# Patient Record
Sex: FEMALE | Race: OTHER | NOT HISPANIC OR LATINO | Employment: STUDENT | RURAL
[De-identification: names, ages, dates, MRNs, and addresses within clinical notes are randomized per-mention and may not be internally consistent; named-entity substitution may affect disease eponyms.]

---
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
## 2023-01-05 DIAGNOSIS — L70.0 ACNE VULGARIS: ICD-10-CM

## 2023-01-05 RX ORDER — TRETINOIN 0.5 MG/G
CREAM TOPICAL
Qty: 45 G | Refills: 0 | Status: SHIPPED | OUTPATIENT
Start: 2023-01-05 | End: 2023-01-25 | Stop reason: SDUPTHER

## 2023-01-25 DIAGNOSIS — L70.0 ACNE VULGARIS: ICD-10-CM

## 2023-01-25 RX ORDER — TRETINOIN 0.5 MG/G
CREAM TOPICAL
Qty: 45 G | Refills: 11 | Status: SHIPPED | OUTPATIENT
Start: 2023-01-25 | End: 2023-01-25 | Stop reason: SDUPTHER

## 2023-01-25 RX ORDER — TRETINOIN 0.5 MG/G
CREAM TOPICAL
Qty: 45 G | Refills: 11 | Status: SHIPPED | OUTPATIENT
Start: 2023-01-25

## 2023-06-28 ENCOUNTER — OFFICE VISIT (OUTPATIENT)
Dept: DERMATOLOGY | Facility: CLINIC | Age: 18
End: 2023-06-28
Payer: COMMERCIAL

## 2023-06-28 VITALS — HEIGHT: 63 IN | WEIGHT: 123.44 LBS | BODY MASS INDEX: 21.87 KG/M2

## 2023-06-28 DIAGNOSIS — L70.0 ACNE VULGARIS: Primary | ICD-10-CM

## 2023-06-28 DIAGNOSIS — L70.0 ACNE VULGARIS: ICD-10-CM

## 2023-06-28 PROCEDURE — 3008F PR BODY MASS INDEX (BMI) DOCUMENTED: ICD-10-PCS | Mod: ,,, | Performed by: DERMATOLOGY

## 2023-06-28 PROCEDURE — 1160F RVW MEDS BY RX/DR IN RCRD: CPT | Mod: ,,, | Performed by: DERMATOLOGY

## 2023-06-28 PROCEDURE — 3008F BODY MASS INDEX DOCD: CPT | Mod: ,,, | Performed by: DERMATOLOGY

## 2023-06-28 PROCEDURE — 99214 PR OFFICE/OUTPT VISIT, EST, LEVL IV, 30-39 MIN: ICD-10-PCS | Mod: ,,, | Performed by: DERMATOLOGY

## 2023-06-28 PROCEDURE — 1159F MED LIST DOCD IN RCRD: CPT | Mod: ,,, | Performed by: DERMATOLOGY

## 2023-06-28 PROCEDURE — 1159F PR MEDICATION LIST DOCUMENTED IN MEDICAL RECORD: ICD-10-PCS | Mod: ,,, | Performed by: DERMATOLOGY

## 2023-06-28 PROCEDURE — 99214 OFFICE O/P EST MOD 30 MIN: CPT | Mod: ,,, | Performed by: DERMATOLOGY

## 2023-06-28 PROCEDURE — 1160F PR REVIEW ALL MEDS BY PRESCRIBER/CLIN PHARMACIST DOCUMENTED: ICD-10-PCS | Mod: ,,, | Performed by: DERMATOLOGY

## 2023-06-28 RX ORDER — NORGESTIMATE AND ETHINYL ESTRADIOL 7DAYSX3 LO
KIT ORAL
Qty: 84 TABLET | Refills: 5 | Status: SHIPPED | OUTPATIENT
Start: 2023-06-28

## 2023-06-28 RX ORDER — DOXYCYCLINE 100 MG/1
100 CAPSULE ORAL EVERY 12 HOURS
Qty: 60 CAPSULE | Refills: 0 | Status: SHIPPED | OUTPATIENT
Start: 2023-06-28 | End: 2023-07-28

## 2023-06-28 NOTE — Clinical Note
Will you please refer Gemini to [facility] dermatology?  She will need an appointment around the end of August for isotretinoin follow up.  Here is there website:  [email or web address]  Will you also remind Gemini to get her labwork if she doesn't go today.

## 2023-06-28 NOTE — PROGRESS NOTES
[facility]   Za Short MD    Patient Name: Gemini Ulloa  Patient YOB: 2005  Date of Service: 6/28/23    CC: Acne    HPI: Gemini Ulloa is a 17 y.o. female who is following up for acne vulgaris currently on tretinoin.  Previous treatments include a topical retinoid, a topical antibiotic and systemic antibiotics and a previous course of isotretinoin.  Overall, her acne is still flaring despite treatment.  She has been compliant with her treatment plan.  Patient is not currently pregnant, breast feeding, or trying to become pregnant.    A Focused review of systems was negative.    Past Medical History:   Diagnosis Date    Acne     HSV (herpes simplex virus) infection      Past Surgical History:   Procedure Laterality Date    EYE SURGERY  06/2021     Review of patient's allergies indicates:  No Known Allergies    Current Outpatient Medications:     doxycycline (MONODOX) 100 MG capsule, Take 1 capsule (100 mg total) by mouth every 12 (twelve) hours., Disp: 60 capsule, Rfl: 0    HYDROcodone-acetaminophen (NORCO) 7.5-325 mg per tablet, Take 1 tablet by mouth 4 (four) times daily as needed., Disp: , Rfl:     norgestimate-ethinyl estradioL (ORTHO TRI-CYCLEN LO) 0.18/0.215/0.25 mg-25 mcg tablet, Take one tablet PO daily following instructions on packaging, Disp: 84 tablet, Rfl: 5    RESTASIS 0.05 % ophthalmic emulsion, , Disp: , Rfl:     TOBRADEX 0.3-0.1 % DrpS, , Disp: , Rfl:     tretinoin (RETIN-A) 0.05 % cream, Apply pea-sized amount to face every other night advancing to nightly when tolerated, Disp: 45 g, Rfl: 11    valACYclovir (VALTREX) 1000 MG tablet, Take 1 tablet (1,000 mg total) by mouth 3 (three) times daily. for 7 days, Disp: 21 tablet, Rfl: 2    Exam: A skin exam included his face, chest and back.  General Appearance of the patient is well developed and well nourished.  Orientation: alert and oriented x 3.  Mood and affect: pleasant.  Findings in the above examined areas were normal with  the exception of the following exam descriptions below:    Acne Vulgaris (L70.0)  - Comedonal papules and inflammatory papules and pustules located on the face, chest, and back with scarring.    Status: Inadequately controlled   Failed treatments: topical retinoid, a topical antibiotic and systemic antibiotics    Plan: Isotretinoin Initiation  Patient weight: 53.8kg    Indications:  Severe acne with scarring.  Lack of improvement on combination oral antibiotics and topical medications.    Treatment Protocol:  1 mg/kg until a cumulative dose of 120-150 mg/kg is reached.    Counseling:  I reviewed the side effect in detail including the risk of birth difects. Patient should be on two forms of birth control, get monthly blood tests, not donate blood, not drive at night if vision affected, and not share medication. I also discussed the risks of depression  Primary Contraception Method: OCPs  Secondary Contraception Method: male latex condoms    - will continue tretinoin and start 1 month course of doxycycline until she starts isotretinoin in 1 month after 2nd pregnancy test     Medications Ordered This Encounter   Medications    doxycycline (MONODOX) 100 MG capsule     Sig: Take 1 capsule (100 mg total) by mouth every 12 (twelve) hours.     Dispense:  60 capsule     Refill:  0    norgestimate-ethinyl estradioL (ORTHO TRI-CYCLEN LO) 0.18/0.215/0.25 mg-25 mcg tablet     Sig: Take one tablet PO daily following instructions on packaging     Dispense:  84 tablet     Refill:  5     3 month supply       High Risk Medication Monitoring (Z79.899) : The risks and benefits of the medication were reviewed in full with the patient. Should any side effects occur, the patient will stop the medication and contact me immediately.  - Will order pregnancy test, CBC, CMP, and fasting lipids for baseline labwork.    Follow up in about 4 weeks (around 7/26/2023) for FU Accutane.    Za Short MD

## 2023-07-31 ENCOUNTER — OFFICE VISIT (OUTPATIENT)
Dept: DERMATOLOGY | Facility: CLINIC | Age: 18
End: 2023-07-31
Payer: COMMERCIAL

## 2023-07-31 DIAGNOSIS — Z79.899 HIGH RISK MEDICATION USE: ICD-10-CM

## 2023-07-31 DIAGNOSIS — L70.0 ACNE VULGARIS: Primary | ICD-10-CM

## 2023-07-31 PROCEDURE — 99214 OFFICE O/P EST MOD 30 MIN: CPT | Mod: ,,, | Performed by: DERMATOLOGY

## 2023-07-31 PROCEDURE — 99214 PR OFFICE/OUTPT VISIT, EST, LEVL IV, 30-39 MIN: ICD-10-PCS | Mod: ,,, | Performed by: DERMATOLOGY

## 2023-07-31 NOTE — PROGRESS NOTES
[place] [facility]   Za Short MD    Patient Name: Gemini Ulloa  Patient YOB: 2005  Date of Service: 7/31/23    CC: Acne    HPI: Gemini Ulloa is a 17 y.o. female who is following up for acne vulgaris currently on doxycycline and tretinoin.  Previous treatments include a topical retinoid, a topical antibiotic and systemic antibiotics.  Overall, her acne is still flaring despite treatment.  She has been compliant with her treatment plan.  Patient is not currently pregnant, breast feeding, or trying to become pregnant.    A Focused review of systems was negative.    Past Medical History:   Diagnosis Date    Acne     HSV (herpes simplex virus) infection      Past Surgical History:   Procedure Laterality Date    EYE SURGERY  06/2021     Review of patient's allergies indicates:  No Known Allergies    Current Outpatient Medications:     HYDROcodone-acetaminophen (NORCO) 7.5-325 mg per tablet, Take 1 tablet by mouth 4 (four) times daily as needed., Disp: , Rfl:     norgestimate-ethinyl estradioL (ORTHO TRI-CYCLEN LO) 0.18/0.215/0.25 mg-25 mcg tablet, Take one tablet PO daily following instructions on packaging, Disp: 84 tablet, Rfl: 5    RESTASIS 0.05 % ophthalmic emulsion, , Disp: , Rfl:     TOBRADEX 0.3-0.1 % DrpS, , Disp: , Rfl:     tretinoin (RETIN-A) 0.05 % cream, Apply pea-sized amount to face every other night advancing to nightly when tolerated, Disp: 45 g, Rfl: 11    valACYclovir (VALTREX) 1000 MG tablet, Take 1 tablet (1,000 mg total) by mouth 3 (three) times daily. for 7 days, Disp: 21 tablet, Rfl: 2    Exam: A skin exam included his face, chest and back.  General Appearance of the patient is well developed and well nourished.  Orientation: alert and oriented x 3.  Mood and affect: pleasant.  Findings in the above examined areas were normal with the exception of the following exam descriptions below:    Acne Vulgaris (L70.0)  - Comedonal papules and inflammatory papules and pustules located on the  face, chest, and back with scarring.    Status: Inadequately controlled   Failed treatments: topical retinoid, a topical antibiotic and systemic antibiotics    Plan: Isotretinoin Initiation  Patient weight: 56kg    Indications:  Severe acne with scarring.  Lack of improvement on combination oral antibiotics and topical medications.    Treatment Protocol:  1 mg/kg until a cumulative dose of 120-150 mg/kg is reached.    Counseling:  I reviewed the side effect in detail including the risk of birth difects. Patient should be on two forms of birth control, get monthly blood tests, not donate blood, not drive at night if vision affected, and not share medication. I also discussed the risks of depression  Primary Contraception Method: OCPs  Secondary Contraception Method: male latex condoms     - pt will follow up at [facility] since patient is restarting college soon.  She has coordinated a dermatology appointment there and we will forward her records to [facility]       High Risk Medication Monitoring (Z79.899) : The risks and benefits of the medication were reviewed in full with the patient. Should any side effects occur, the patient will stop the medication and contact me immediately.  - Will order 2nd pregnancy test     Follow up if symptoms worsen or fail to improve.    Za Short MD

## 2023-08-01 DIAGNOSIS — L70.0 ACNE VULGARIS: Primary | ICD-10-CM

## 2023-08-01 RX ORDER — ISOTRETINOIN 30 MG/1
30 CAPSULE ORAL DAILY
Qty: 30 CAPSULE | Refills: 0 | Status: SHIPPED | OUTPATIENT
Start: 2023-08-01 | End: 2023-08-31